# Patient Record
Sex: MALE | Race: WHITE | NOT HISPANIC OR LATINO | Employment: FULL TIME | URBAN - METROPOLITAN AREA
[De-identification: names, ages, dates, MRNs, and addresses within clinical notes are randomized per-mention and may not be internally consistent; named-entity substitution may affect disease eponyms.]

---

## 2019-11-26 ENCOUNTER — OFFICE VISIT (OUTPATIENT)
Dept: URGENT CARE | Facility: CLINIC | Age: 49
End: 2019-11-26
Payer: OTHER MISCELLANEOUS

## 2019-11-26 VITALS
TEMPERATURE: 98.7 F | BODY MASS INDEX: 34.72 KG/M2 | DIASTOLIC BLOOD PRESSURE: 74 MMHG | HEIGHT: 73 IN | WEIGHT: 262 LBS | SYSTOLIC BLOOD PRESSURE: 118 MMHG | HEART RATE: 64 BPM | RESPIRATION RATE: 16 BRPM | OXYGEN SATURATION: 98 %

## 2019-11-26 DIAGNOSIS — T15.91XA FOREIGN BODY OF RIGHT EYE, INITIAL ENCOUNTER: ICD-10-CM

## 2019-11-26 PROCEDURE — 99203 OFFICE O/P NEW LOW 30 MIN: CPT | Performed by: FAMILY MEDICINE

## 2019-11-26 RX ORDER — POLYMYXIN B SULFATE AND TRIMETHOPRIM 1; 10000 MG/ML; [USP'U]/ML
1 SOLUTION OPHTHALMIC EVERY 4 HOURS
Qty: 10 ML | Refills: 0 | Status: SHIPPED | OUTPATIENT
Start: 2019-11-26

## 2019-11-26 RX ORDER — METHOTREXATE SODIUM 1 G/1
INJECTION, POWDER, LYOPHILIZED, FOR SOLUTION INTRA-ARTERIAL; INTRAMUSCULAR; INTRATHECAL; INTRAVENOUS
COMMUNITY

## 2019-11-26 ASSESSMENT — ENCOUNTER SYMPTOMS
DOUBLE VISION: 0
FEVER: 0
VOMITING: 0
BLURRED VISION: 0
PHOTOPHOBIA: 0
EYE REDNESS: 1
SHORTNESS OF BREATH: 0

## 2019-11-26 NOTE — PROGRESS NOTES
"Subjective:     Damon Mccann is a 49 y.o. male who presents for Foreign Body in Eye (x1 day foreign body in (R) eye, swelling, redness.)    HPI  Pt presents for evaluation of a new problem   Pt with right eye irritation the past 24 hours   Was doing some work on a car engine 2 days ago and wonders if he got some dust in his eye  Has tried flushing his eye  Has tried cold compresses  Nothing seems to be helping his eye  No associated changes in vision, blurry vision, spots in vision  Eyelid is a little swollen and nonerythematous    Review of Systems   Constitutional: Negative for fever.   Eyes: Positive for redness. Negative for blurred vision, double vision and photophobia.   Respiratory: Negative for shortness of breath.    Cardiovascular: Negative for chest pain.   Gastrointestinal: Negative for vomiting.   Skin: Negative for rash.       PMH: History rheumatoid arthritis  MEDS:   Current Outpatient Medications:   •  methotrexate 1 GM Recon Soln, by Injection route., Disp: , Rfl:   ALLERGIES:   Allergies   Allergen Reactions   • Pcn [Penicillins]      SURGHX: History reviewed. No pertinent surgical history.  SOCHX:  reports that he has been smoking cigars. He has been smoking about 0.00 packs per day. He has never used smokeless tobacco. He reports previous alcohol use. He reports that he does not use drugs.  FH: Family history was reviewed, not contributing to acute complaint      Objective:   /74   Pulse 64   Temp 37.1 °C (98.7 °F) (Temporal)   Resp 16   Ht 1.854 m (6' 1\")   Wt 118.8 kg (262 lb)   SpO2 98%   BMI 34.57 kg/m²     Physical Exam  Constitutional:       General: He is not in acute distress.     Appearance: He is well-developed. He is not diaphoretic.   HENT:      Head: Normocephalic and atraumatic.   Eyes:      Extraocular Movements: Extraocular movements intact.      Pupils: Pupils are equal, round, and reactive to light.      Comments:   Right eye with moderate scleral injection, no " discharge present, tiny foreign body in lateral lower eyelid which appears to be a grain of sand   Skin:     General: Skin is warm and dry.      Findings: No rash.   Neurological:      Mental Status: He is alert and oriented to person, place, and time.   Psychiatric:         Mood and Affect: Mood normal.         Behavior: Behavior normal.         Thought Content: Thought content normal.         Judgment: Judgment normal.         Assessment/Plan:   Assessment    1. Foreign body of right eye, initial encounter  - polymixin-trimethoprim (POLYTRIM) 84769-5.1 UNIT/ML-% Solution; Place 1 Drop in both eyes every 4 hours.  Dispense: 10 mL; Refill: 0    Patient is a 49-year-old male with foreign body in right eye which appears to be a grain of sand.  Patient's eye was flushed with normal saline and foreign body removed.  Patient will be started on antibiotic eyedrops for a few days to prevent development of infection.  Patient given follow-up precautions and will follow-up as needed.